# Patient Record
Sex: FEMALE | Employment: UNEMPLOYED | URBAN - METROPOLITAN AREA
[De-identification: names, ages, dates, MRNs, and addresses within clinical notes are randomized per-mention and may not be internally consistent; named-entity substitution may affect disease eponyms.]

---

## 2019-11-26 DIAGNOSIS — O24.414 INSULIN CONTROLLED GESTATIONAL DIABETES MELLITUS (GDM) IN THIRD TRIMESTER: Primary | ICD-10-CM

## 2019-12-02 ENCOUNTER — INITIAL CONSULT (OUTPATIENT)
Dept: MATERNAL FETAL MEDICINE | Facility: CLINIC | Age: 24
End: 2019-12-02
Payer: OTHER GOVERNMENT

## 2019-12-02 VITALS
DIASTOLIC BLOOD PRESSURE: 78 MMHG | WEIGHT: 224 LBS | BODY MASS INDEX: 41.22 KG/M2 | HEART RATE: 94 BPM | RESPIRATION RATE: 20 BRPM | SYSTOLIC BLOOD PRESSURE: 122 MMHG | OXYGEN SATURATION: 98 % | HEIGHT: 62 IN

## 2019-12-02 DIAGNOSIS — O24.414 INSULIN CONTROLLED GESTATIONAL DIABETES MELLITUS (GDM) IN THIRD TRIMESTER: ICD-10-CM

## 2019-12-02 PROCEDURE — 99204 PR OFFICE/OUTPT VISIT, NEW, LEVL IV, 45-59 MIN: ICD-10-PCS | Mod: 25,S$GLB,, | Performed by: OBSTETRICS & GYNECOLOGY

## 2019-12-02 PROCEDURE — 99204 OFFICE O/P NEW MOD 45 MIN: CPT | Mod: 25,S$GLB,, | Performed by: OBSTETRICS & GYNECOLOGY

## 2019-12-02 PROCEDURE — 76811 OB US DETAILED SNGL FETUS: CPT | Mod: S$GLB,,, | Performed by: OBSTETRICS & GYNECOLOGY

## 2019-12-02 PROCEDURE — 76811 PR US, OB FETAL EVAL & EXAM, TRANSABDOM,FIRST GESTATION: ICD-10-PCS | Mod: S$GLB,,, | Performed by: OBSTETRICS & GYNECOLOGY

## 2019-12-02 RX ORDER — NAPROXEN SODIUM 220 MG/1
81 TABLET, FILM COATED ORAL DAILY
COMMUNITY

## 2019-12-02 NOTE — LETTER
December 2, 2019        [facility]  [address]  [place] [address]  Phone: [phone]  Fax: [phone]   Patient: Eulogio Jack   MR Number: [id number]   YOB: 1995   Date of Visit: 12/2/2019       Dear Dr. Shannan Resendiz:    Thank you for referring Eulogio Jack to me for evaluation. Attached you will find relevant portions of my assessment and plan of care.    If you have questions, please do not hesitate to call me. I look forward to following Eulogio Jack along with you.    Sincerely,      Garett Aleman MD        CC  [facility]

## 2019-12-02 NOTE — PROGRESS NOTES
Indication for consultation:  Gestational diabetes requiring insulin therapy    Provider requesting consultation:  Dr. Resendiz    Dear Dr. Resendiz    I had the pleasure of seeing Eulogio Jack for initial consultation today.  As you recall she is a 24 y.o.  at 33w4d here for consultation regarding gestational diabetes that has required insulin therapy for glucose control.    Of note the patient has been recording her glucose levels and has been recently started in the last week on 10 units of NPH at bedtime.  Upon further questioning the patient states this insulin was started in an effort to control her dinner glucose levels.  According to the patient her fasting levels were never of concern.    PMH:  The patient denies any other chronic medical conditions.  She does have a prior history of preeclampsia with her last delivery in 2017.  She is currently taking a low-dose aspirin for the prevention of recurrent preeclampsia.    Ob Hx:  This is the patient's 2nd pregnancy. Her 1st pregnancy was a spontaneous vaginal delivery at 40 weeks of an 8 lb 11 oz female .  That delivery was complicated by preeclampsia.    PSH:  She has a prior history of an appendectomy.    SOC:  She denies any tobacco, alcohol or recreational drug use.    Medications:  NPH insulin 10 units at bedtime.  The patient also takes a low-dose aspirin for the prevention of preeclampsia.    Family hx:  She denies any family history of congenital anomalies.  She denies any family history of genetic abnormalities.    Allergies:  No known drug allergies    Review of systems: The patient denies any vaginal bleeding, loss of fluid or contraction pain today.  Vitals:    19 1253   BP: 122/78   Pulse: 94   Resp: 20   Weight:224lbs      Physical exam:  Gen: WDWN in NAD  HEENT: WNL  Abdomen: Soft, non-tender  Skin: No rash or jaundice  Extremities: No clubbing or cyanosis  Neuro: Grossly intact    Ultrasound:  A detailed fetal anatomical  survey was completed today.  There is a single interim pregnancy in the cephalic presentation.  The estimated fetal weight is 2796 g which is the 89th percentile using Hadlock criteria with the abdominal circumference at over the 98th percentile.  Structurally the fetus does not show any evidence of anomaly.  The placenta does not show any evidence of previa.  The amniotic fluid volume appears to be normal. The biophysical profile is 8 of 8.  Please see official report for full specifics.    Recommendations:  Today I reviewed with the patient the pathophysiology underlying gestational diabetes.  I reviewed with the patient the need to try to keep her fasting less than 90 and her 2 hr postprandial less than 120 mg/dL.  Would reviewing her glucose log I do not see any overt persistent hyperglycemia.  As a matter of fact her fasting levels in the a.m. around the mid 70s with the use of NPH at bedtime.  I counseled the patient that the use of NPH after dinner to control her dinner glucose levels would not fit work physiologic.  A better therapy would to be give her regular insulin at the time of her dinner meal to control her postprandial dinner measurements.  Since her fasting levels are in the mid 70s and she sometimes feels abnormal with these measurements I recommended that for now she discontinue this NPH at bedtime.  I would like for her to continue to check her glucose levels in send a copy of her log to my office later this week so that we can determine whether not further insulin therapy is needed.  If later this week her glucose levels continued to be without evidence of persistent hyperglycemia then no therapy will be indicated.    From a followup standpoint, I would like for her to send her glucose log to our offices once per week so we can help with diabetes management.  Additionally like for her to return to our office again in 4 weeks to reassess fetal growth and well-being.  I do have some concerns that  this fetus may need to be delivered early because of this enlarged abdominal circumference.  Further recommendations to follow with our next visit.    Thanks once again for allowing us to participate in the care of your patients.  If you have any questions about today's consultation feel free to contact me or my partners at [phone].    Sincerely,

## 2019-12-02 NOTE — PROGRESS NOTES
"Eulogio is here for initial MFM consultation for gestational diabetes in pregnancy, referred by Dr. Resendiz at [facility].    She is feeling fetal movement, and was instructed on kick counts, 10 movements within 2 hours after breakfast and dinner.    Eulogio denies vaginal bleeding, loss of fluid, recurrent contractions.    She did bring her glucose log today.  She is taking medications for control.      Insulin:    10 units N at HS    Vitals:    12/02/19 1253   BP: 122/78   Pulse: 94   Resp: 20   SpO2: 98%   Weight: 101.6 kg (224 lb)   Height: 5' 2" (1.575 m)     BMI:                    40.97 kg/m^2               "

## 2019-12-23 DIAGNOSIS — O24.414 INSULIN CONTROLLED GESTATIONAL DIABETES MELLITUS (GDM) IN THIRD TRIMESTER: Primary | ICD-10-CM

## 2019-12-30 ENCOUNTER — PROCEDURE VISIT (OUTPATIENT)
Dept: MATERNAL FETAL MEDICINE | Facility: CLINIC | Age: 24
End: 2019-12-30
Payer: OTHER GOVERNMENT

## 2019-12-30 VITALS
SYSTOLIC BLOOD PRESSURE: 110 MMHG | DIASTOLIC BLOOD PRESSURE: 68 MMHG | OXYGEN SATURATION: 98 % | RESPIRATION RATE: 20 BRPM | WEIGHT: 222 LBS | HEART RATE: 88 BPM | BODY MASS INDEX: 40.85 KG/M2 | HEIGHT: 62 IN

## 2019-12-30 DIAGNOSIS — O24.414 INSULIN CONTROLLED GESTATIONAL DIABETES MELLITUS (GDM) IN THIRD TRIMESTER: ICD-10-CM

## 2019-12-30 PROCEDURE — 76816 OB US FOLLOW-UP PER FETUS: CPT | Mod: S$GLB,,, | Performed by: OBSTETRICS & GYNECOLOGY

## 2019-12-30 PROCEDURE — 76816 PR  US,PREGNANT UTERUS,F/U,TRANSABD APP: ICD-10-PCS | Mod: S$GLB,,, | Performed by: OBSTETRICS & GYNECOLOGY

## 2019-12-30 PROCEDURE — 99212 OFFICE O/P EST SF 10 MIN: CPT | Mod: 25,S$GLB,, | Performed by: OBSTETRICS & GYNECOLOGY

## 2019-12-30 PROCEDURE — 99212 PR OFFICE/OUTPT VISIT, EST, LEVL II, 10-19 MIN: ICD-10-PCS | Mod: 25,S$GLB,, | Performed by: OBSTETRICS & GYNECOLOGY

## 2019-12-30 NOTE — PROGRESS NOTES
"Eulogio is here for followup [facility] consultation for gestational diabetes in pregnancy, referred by Dr. Resendiz at [facility].    She is feeling fetal movement.    Eulogio denies vaginal bleeding, loss of fluid, recurrent contractions.    She did bring her glucose log today.  She is not taking medications for control.      Dr. Aleman stopped her Insulin at last M visit and has been calling her glucose levels to the [place] office weekly.    Vitals:    12/30/19 1317   BP: 110/68   Pulse: 88   Resp: 20   SpO2: 98%   Weight: 100.7 kg (222 lb)   Height: 5' 2" (1.575 m)     BMI:                    40.6 kg/m^2               "

## 2019-12-30 NOTE — PROGRESS NOTES
Follow-up MFM Consultation  Referring provider: Dr. Resendiz    Indications for referral:  1) Pregnancy at 37 weeks and 4 days gestation (EDC 20)  2) Gestational Diabetes Mellitus    Dear Dr Resendiz.,  Thank you for your kind request for consultation and imaging of [facility].  She presents due to the above listed indications.  She was last seen  by my partner Dr. Aleman who discontinued insulin at that time (previously on NPH 10 units hs).  We reviewed her blood glucose log today and less than 30% of her values are hyperglycemic.  She has no complaints today.    Physical Exam  Vital signs are normal.  General: Age appearing female in no apparent distress.  ABDOMEN:  Gravid, soft, nontender  NEURO: No focal deficits.    ULTRASOUND FINDINGS:  A repeat ultrasound study was completed. EFW of 3545 is at the 76th percentile.  The fetus is cephalic.  Amniotic fluid is normal. There are no fetal structural malformations to extent of view, but most of the anatomy is suboptimally visualized due to advanced gestation and fetal positioning.    IMPRESSION:  37 week gestation complicated by well-controlled Gestational Diabetes Mellitus and normal fetal growth and well being on ultrasound.    RECOMMENDATIONS/DISCUSSION:  1) Delivery is scheduled 20.  I agree with this.  2) No additional MFM follow-up is indicated.  3) Continue  testing until delivery.     Thank you for allowing us to participate in her care.  Please do not hesitate to call with questions.

## 2019-12-30 NOTE — LETTER
December 30, 2019        Shannan Resendiz             [facility]  [address]  [place] [address]  Phone: [phone]  Fax: [phone]   Patient: Eulogio Jack   MR Number: [id number]   YOB: 1995   Date of Visit: 12/30/2019       Dear Dr. Resendiz:    Thank you for referring Eulogio Jack to me for evaluation. Below are the relevant portions of my assessment and plan of care.            If you have questions, please do not hesitate to call me. I look forward to following Eulogio along with you.    Sincerely,      Radha Hanson RN           CC  [facility]

## 2019-12-30 NOTE — LETTER
December 30, 2019        Shannan Resendiz             [facility]  [address]  [place] [address]  Phone: [phone]  Fax: [phone]   Patient: Eulogio Jack   MR Number: [id number]   YOB: 1995   Date of Visit: 12/30/2019       Dear Dr. Resendiz:    Thank you for referring Eulogio Jack to me for evaluation. Below are the relevant portions of my assessment and plan of care.            If you have questions, please do not hesitate to call me. I look forward to following Eulogio along with you.    Sincerely,      Brittany Hawthorne MD        [facility]